# Patient Record
Sex: MALE | Race: WHITE | NOT HISPANIC OR LATINO | Employment: OTHER | URBAN - METROPOLITAN AREA
[De-identification: names, ages, dates, MRNs, and addresses within clinical notes are randomized per-mention and may not be internally consistent; named-entity substitution may affect disease eponyms.]

---

## 2017-05-30 ENCOUNTER — HOSPITAL ENCOUNTER (EMERGENCY)
Facility: CLINIC | Age: 62
Discharge: HOME | End: 2017-05-30
Attending: FAMILY MEDICINE | Admitting: FAMILY MEDICINE
Payer: COMMERCIAL

## 2017-05-30 ENCOUNTER — APPOINTMENT (OUTPATIENT)
Dept: GENERAL RADIOLOGY | Facility: CLINIC | Age: 62
End: 2017-05-30
Attending: FAMILY MEDICINE
Payer: COMMERCIAL

## 2017-05-30 VITALS
SYSTOLIC BLOOD PRESSURE: 136 MMHG | RESPIRATION RATE: 14 BRPM | OXYGEN SATURATION: 99 % | HEART RATE: 80 BPM | DIASTOLIC BLOOD PRESSURE: 82 MMHG | TEMPERATURE: 98.4 F

## 2017-05-30 DIAGNOSIS — S61.241A: ICD-10-CM
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
## 2017-05-30 DIAGNOSIS — S60.459A FOREIGN BODY OF FINGER OF LEFT HAND, INITIAL ENCOUNTER: Primary | ICD-10-CM

## 2017-05-30 PROCEDURE — 99283 EMERGENCY DEPT VISIT LOW MDM: CPT | Performed by: FAMILY MEDICINE

## 2017-05-30 PROCEDURE — 25000132 ZZH RX MED GY IP 250 OP 250 PS 637: Performed by: FAMILY MEDICINE

## 2017-05-30 PROCEDURE — 99283 EMERGENCY DEPT VISIT LOW MDM: CPT | Mod: Z6 | Performed by: FAMILY MEDICINE

## 2017-05-30 PROCEDURE — 25000125 ZZHC RX 250: Performed by: FAMILY MEDICINE

## 2017-05-30 PROCEDURE — 73140 X-RAY EXAM OF FINGER(S): CPT | Mod: TC,LT

## 2017-05-30 RX ORDER — LIDOCAINE HYDROCHLORIDE 10 MG/ML
10 INJECTION, SOLUTION INFILTRATION; PERINEURAL ONCE
Status: COMPLETED | OUTPATIENT
Start: 2017-05-30 | End: 2017-05-30

## 2017-05-30 RX ORDER — LORATADINE 10 MG/1
10 TABLET ORAL DAILY
COMMUNITY

## 2017-05-30 RX ADMIN — AMOXICILLIN AND CLAVULANATE POTASSIUM 1 TABLET: 875; 125 TABLET, FILM COATED ORAL at 14:33

## 2017-05-30 RX ADMIN — LIDOCAINE HYDROCHLORIDE 2 ML: 10 INJECTION, SOLUTION INFILTRATION; PERINEURAL at 14:44

## 2017-05-30 NOTE — ED NOTES
Nail pulled out by MD. Finger started bleeding. Dressing applied. Pt can now go to xray. Pain is under control. Current on TDAP.

## 2017-05-30 NOTE — ED AVS SNAPSHOT
[facility] Emergency Department    [address] [place] [address]    Phone:  [phone]    Fax:  [phone]                                       Arias Mckeon   MRN: [id number]    Department:  [facility] Emergency Department   Date of Visit:  5/30/2017           After Visit Summary Signature Page     I have received my discharge instructions, and my questions have been answered. I have discussed any challenges I see with this plan with the nurse or doctor.    ..........................................................................................................................................  Patient/Patient Representative Signature      ..........................................................................................................................................  Patient Representative Print Name and Relationship to Patient    ..................................................               ................................................  Date                                            Time    ..........................................................................................................................................  Reviewed by Signature/Title    ...................................................              ..............................................  Date                                                            Time

## 2017-05-30 NOTE — ED PROVIDER NOTES
History     Chief Complaint   Patient presents with     Hand Injury     The history is provided by the patient.     Arias Mckeon is a 62 year old male who is here with a 16d nail in the left index finger. He shot it from a nail gun and it went across the back of the left index finger. This happened prior to arrival.  The finger still seems to work okay. His last tetanus was in the past few years.    Nurse Note:  Here with hand injury. Nail to second digit on left hand. Nail through and through the top part of tissue on pointer finger. Last tetanus unknown      I have reviewed the Medications, Allergies, Past Medical and Surgical History, and Social History in the Epic system.    MIIC shows that his last tetanus was October 2013.     Review of Systems   Musculoskeletal:        No previous injury to this finger   All other systems reviewed and are negative.      Physical Exam   BP: 127/77  Heart Rate: 55  Temp: 98.4  F (36.9  C)  Resp: 14  SpO2: 99 %    Physical Exam   Cardiovascular: Intact distal pulses.    Musculoskeletal:   He has a 16 d nail in-and-out of the dorsum of the left index finger, over the middle phalange. See picture. There is full ROM of the left index finger.   Neurological:   Normal sensation of the left index finger   Nursing note and vitals reviewed.      ED Course  2:44 PM  After I used 2 mL of 1% lidocaine at the base of the left index finger, I was able to pull the nail with a linesman's pliers that we have here for this purpose. We will clean this up, get an xray and apply a dressing. His last tetanus was Oct 2013 and we will not update that today.  He would like his Augmentin Rx to [facility] in [place].        ED Course     Procedures    No results found for this or any previous visit (from the past 24 hour(s)).    Medications   lidocaine 1 % injection 10 mL (2 mLs Intradermal Given by Other 5/30/17 5649)   amoxicillin-clavulanate (AUGMENTIN) 875-125 MG per tablet 1 tablet (1 tablet  Oral Given 5/30/17 1323)         Assessments & Plan (with Medical Decision Making)  Arias is a 61 yo male here with a 16d nail in the left index finger.  He was using a nail gun in his right hand when the nail went through the back of his left index finger.  His last tetanus shot was October 2013 and we will not update that today.  I used 2 mL of lidocaine to numb up the finger and pulled the nail out with a linesman's pliers.  X-ray of the finger shows no fracture and no foreign body, and we cleaned up the finger and bandaged it.  I will send a prescription for Augmentin to the [facility] for him.  He was discharged from the ED.       I have reviewed the nursing notes.    I have reviewed the findings, diagnosis, plan and need for follow up with the patient.    New Prescriptions    AMOXICILLIN-CLAVULANATE (AUGMENTIN) 875-125 MG PER TABLET    Take 1 tablet by mouth 2 times daily for 7 days       Final diagnoses:   Foreign body of finger of left hand, initial encounter       5/30/2017   [facility] EMERGENCY DEPARTMENT     Zhao Araujo MD  05/30/17 8435

## 2017-05-30 NOTE — ED NOTES
Here with hand injury. Nail to second digit on left hand. Nail through and through the top part of tissue on pointer finger. Last tetanus unknown

## 2017-05-30 NOTE — DISCHARGE INSTRUCTIONS
16d Nail Under the Skin (Removed)  An object, or foreign body, has been removed from under your skin. Although care was taken to remove all particles present, there is always a chance that a small piece may have been left behind.  Most skin wounds heal without problems. But, there can be an increased risk of infection if any stay under the skin. Sometimes they work their way out on their own, and sometimes they can cause an infection. Very small particles that remain under the skin usually cause no problem and need no further treatment.  Home care  Wound care    Keep the wound clean and dry.    If there is a dressing or bandage, change it when it gets wet or dirty. Otherwise, leave it on for the first 24 hours, then change it once a day or as often as you were instructed.    If stitches or staples were used, clean the wound every day:    After taking off the dressing, wash the area gently with soap and water.    Apply a thin layer of antibiotic ointment to the cut, not a lot. This will keep the wound clean and make it easier to remove the stitches. If it is oozing a lot, you can put a nonstick dressing over it. Then reapply the bandage or dressing as you were instructed.    You can get it wet, just like when you clean it. This means you can shower as usual for the first 24 hours, but do not soak the area in water (no baths tches or swimming) until the stitches or staples are take out.    If a surgical tape or strips were used, keep the area clean and dry. If it becomes wet, blot it dry with a towel.  disease or  Medication    You can take acetaminophen or ibuprofen for pain, unless you were given a different pain medicine to use. If you have chronic liver or kidney disease or ever had a stomach ulcer, or gastrointestinal bleeding or are taking blood thinner medications, talk with your doctor before using these medications.    I did send a prescription for Augmentin to the [facility] pharmacy in [place]. It is  important to finish the antibiotics even if the wound looks better to make sure the infection clears.     Follow-up care  Follow up your doctor or clinic as advised.    Watch for any signs of infection, such as increasing pain, redness, swelling, or pus drainage. If this happens, do not wait for your scheduled visit, rather see a doctor sooner.    Note: Any X-rays taken will be reviewed by a radiologist. You will be notified if there are new findings that may affect your care.  When to seek medical care  Get prompt medical attention if any of the following occur:    Increasing pain in the wound    Redness, swelling or pus coming from the wound    Fever of 100.4 F (38 C) or higher, or as directed by your health care provider    Thank you for choosing our Emergency Department for your care.     Sincerely,    Dr Everton Araujo M.D.

## 2017-06-28 NOTE — ED AVS SNAPSHOT
[facility] Emergency Department    [address] [place] [address]    Phone:  [phone]    Fax:  [phone]                                       Arias Mckeon   MRN: [id number]    Department:  [facility] Emergency Department   Date of Visit:  5/30/2017           Patient Information     Date Of Birth          1955        Your diagnoses for this visit were:     Foreign body of finger of left hand, initial encounter        You were seen by Zhao Araujo MD.      Follow-up Information     Schedule an appointment as soon as possible for a visit with Janine Saul PA-C.    Specialty:  Physician Assistant    Why:  As needed    Contact information:    [facility]  [address] [address]  [phone]          Schedule an appointment as soon as possible for a visit with Janine Saul PA-C.    Specialty:  Physician Assistant    Why:  As needed    Contact information:    [facility]  [address] [address]  [phone]          Discharge Instructions         16d Nail Under the Skin (Removed)  An object, or foreign body, has been removed from under your skin. Although care was taken to remove all particles present, there is always a chance that a small piece may have been left behind.  Most skin wounds heal without problems. But, there can be an increased risk of infection if any stay under the skin. Sometimes they work their way out on their own, and sometimes they can cause an infection. Very small particles that remain under the skin usually cause no problem and need no further treatment.  Home care  Wound care    Keep the wound clean and dry.    If there is a dressing or bandage, change it when it gets wet or dirty. Otherwise, leave it on for the first 24 hours, then change it once a day or as often as you were instructed.    If stitches or staples were used, clean the wound every day:    After taking off the  S:  feels better, no bleeding,   Feels anxious and would like to restart antihypertensives    : maya,      06/28/17  0553   BP: 155/88   Pulse: 96   Resp:    Temp:      Abd: soft, sl tender    Ext: no c/c/e    CBC:    Lab Results  Component Value Date   WBC dressing, wash the area gently with soap and water.    Apply a thin layer of antibiotic ointment to the cut, not a lot. This will keep the wound clean and make it easier to remove the stitches. If it is oozing a lot, you can put a nonstick dressing over it. Then reapply the bandage or dressing as you were instructed.    You can get it wet, just like when you clean it. This means you can shower as usual for the first 24 hours, but do not soak the area in water (no baths tches or swimming) until the stitches or staples are take out.    If a surgical tape or strips were used, keep the area clean and dry. If it becomes wet, blot it dry with a towel.  disease or  Medication    You can take acetaminophen or ibuprofen for pain, unless you were given a different pain medicine to use. If you have chronic liver or kidney disease or ever had a stomach ulcer, or gastrointestinal bleeding or are taking blood thinner medications, talk with your doctor before using these medications.    I did send a prescription for Augmentin to the [facility] pharmacy in [place]. It is important to finish the antibiotics even if the wound looks better to make sure the infection clears.     Follow-up care  Follow up your doctor or clinic as advised.    Watch for any signs of infection, such as increasing pain, redness, swelling, or pus drainage. If this happens, do not wait for your scheduled visit, rather see a doctor sooner.    Note: Any X-rays taken will be reviewed by a radiologist. You will be notified if there are new findings that may affect your care.  When to seek medical care  Get prompt medical attention if any of the following occur:    Increasing pain in the wound    Redness, swelling or pus coming from the wound    Fever of 100.4 F (38 C) or higher, or as directed by your health care provider    Thank you for choosing our Emergency Department for your care.     Sincerely,    Dr Everton Araujo M.D.          24 Hour Appointment  Hotline       To make an appointment at any [facility], call [phone] ([phone]). If you don't have a family doctor or clinic, we will help you find one. [facility] are conveniently located to serve the needs of you and your family.             Review of your medicines      START taking        Dose / Directions Last dose taken    amoxicillin-clavulanate 875-125 MG per tablet   Commonly known as:  AUGMENTIN   Dose:  1 tablet   Quantity:  14 tablet        Take 1 tablet by mouth 2 times daily for 7 days   Refills:  0          Our records show that you are taking the medicines listed below. If these are incorrect, please call your family doctor or clinic.        Dose / Directions Last dose taken    AMLODIPINE BESYLATE PO        Take by mouth daily   Refills:  0        LIPITOR PO   Dose:  40 mg        Take 40 mg by mouth daily   Refills:  0        lisinopril-hydrochlorothiazide 20-12.5 MG per tablet   Commonly known as:  PRINZIDE/ZESTORETIC   Dose:  1 tablet        Take 1 tablet by mouth daily   Refills:  0        loratadine 10 MG tablet   Commonly known as:  CLARITIN   Dose:  10 mg        Take 10 mg by mouth daily   Refills:  0                Prescriptions were sent or printed at these locations (1 Prescription)                   [facility] [address] [place] [address]    Telephone:  [phone]   Fax:  [phone]   Hours:                  E-Prescribed (1 of 1)         amoxicillin-clavulanate (AUGMENTIN) 875-125 MG per tablet                Procedures and tests performed during your visit     Fingers XR, 2-3 views, left      Orders Needing Specimen Collection     None      Pending Results     Date and Time Order Name Status Description    5/30/2017 1434 Fingers XR, 2-3 views, left In process             Pending Culture Results     No orders found from 5/28/2017 to 5/31/2017.            Pending Results Instructions     If you had any  "lab results that were not finalized at the time of your Discharge, you can call the ED Lab Result RN at [phone]. You will be contacted by this team for any positive Lab results or changes in treatment. The nurses are available 7 days a week from 10A to 6:30P.  You can leave a message 24 hours per day and they will return your call.        Thank you for choosing [facility]       Thank you for choosing [facility] for your care. Our goal is always to provide you with excellent care. Hearing back from our patients is one way we can continue to improve our services. Please take a few minutes to complete the written survey that you may receive in the mail after you visit with us. Thank you!        [facility] Information     [facility] lets you send messages to your doctor, view your test results, renew your prescriptions, schedule appointments and more. To sign up, go to [email or web address] . Click on \"Log in\" on the left side of the screen, which will take you to the Welcome page. Then click on \"Sign up Now\" on the right side of the page.     You will be asked to enter the access code listed below, as well as some personal information. Please follow the directions to create your username and password.     Your access code is: [email or web address]  Expires: 2017  3:30 PM     Your access code will  in 90 days. If you need help or a new code, please call your [facility] clinic or [phone].        Care EveryWhere ID     This is your Care EveryWhere ID. This could be used by other organizations to access your [facility] medical records  [id number]        After Visit Summary       This is your record. Keep this with you and show to your community pharmacist(s) and doctor(s) at your next visit.                  "

## 2023-07-25 ENCOUNTER — HOSPITAL ENCOUNTER (EMERGENCY)
Facility: CLINIC | Age: 68
Discharge: HOME | End: 2023-07-25
Attending: STUDENT IN AN ORGANIZED HEALTH CARE EDUCATION/TRAINING PROGRAM | Admitting: STUDENT IN AN ORGANIZED HEALTH CARE EDUCATION/TRAINING PROGRAM
Payer: COMMERCIAL

## 2023-07-25 VITALS
TEMPERATURE: 98.2 F | HEART RATE: 75 BPM | HEIGHT: 73 IN | WEIGHT: 256.7 LBS | BODY MASS INDEX: 34.02 KG/M2 | RESPIRATION RATE: 18 BRPM | DIASTOLIC BLOOD PRESSURE: 68 MMHG | SYSTOLIC BLOOD PRESSURE: 121 MMHG | OXYGEN SATURATION: 96 %

## 2023-07-25 DIAGNOSIS — I95.1 ORTHOSTATIC HYPOTENSION: ICD-10-CM

## 2023-07-25 DIAGNOSIS — D72.819 LEUKOPENIA, UNSPECIFIED TYPE: ICD-10-CM

## 2023-07-25 DIAGNOSIS — R42 LIGHTHEADEDNESS: ICD-10-CM

## 2023-07-25 DIAGNOSIS — D64.9 ANEMIA, UNSPECIFIED TYPE: ICD-10-CM

## 2023-07-25 LAB
ALBUMIN SERPL BCG-MCNC: 3.5 G/DL (ref 3.5–5.2)
ALP SERPL-CCNC: 76 U/L (ref 40–129)
ALT SERPL W P-5'-P-CCNC: 17 U/L (ref 0–70)
ANION GAP SERPL CALCULATED.3IONS-SCNC: 9 MMOL/L (ref 7–15)
AST SERPL W P-5'-P-CCNC: 9 U/L (ref 0–45)
BILIRUB SERPL-MCNC: 0.7 MG/DL
BUN SERPL-MCNC: 21.3 MG/DL (ref 8–23)
CALCIUM SERPL-MCNC: 8.7 MG/DL (ref 8.8–10.2)
CHLORIDE SERPL-SCNC: 103 MMOL/L (ref 98–107)
CREAT SERPL-MCNC: 1.12 MG/DL (ref 0.67–1.17)
DEPRECATED HCO3 PLAS-SCNC: 29 MMOL/L (ref 22–29)
ERYTHROCYTE [DISTWIDTH] IN BLOOD BY AUTOMATED COUNT: 15.7 % (ref 10–15)
GFR SERPL CREATININE-BSD FRML MDRD: 71 ML/MIN/1.73M2
GLUCOSE SERPL-MCNC: 153 MG/DL (ref 70–99)
HCT VFR BLD AUTO: 30.8 % (ref 40–53)
HGB BLD-MCNC: 10.3 G/DL (ref 13.3–17.7)
MCH RBC QN AUTO: 30.2 PG (ref 26.5–33)
MCHC RBC AUTO-ENTMCNC: 33.4 G/DL (ref 31.5–36.5)
MCV RBC AUTO: 90 FL (ref 78–100)
PLATELET # BLD AUTO: 60 10E3/UL (ref 150–450)
POTASSIUM SERPL-SCNC: 3.7 MMOL/L (ref 3.4–5.3)
PROT SERPL-MCNC: 5.4 G/DL (ref 6.4–8.3)
RBC # BLD AUTO: 3.41 10E6/UL (ref 4.4–5.9)
SODIUM SERPL-SCNC: 141 MMOL/L (ref 136–145)
TROPONIN T SERPL HS-MCNC: 15 NG/L
WBC # BLD AUTO: 1.5 10E3/UL (ref 4–11)

## 2023-07-25 PROCEDURE — 93010 ELECTROCARDIOGRAM REPORT: CPT | Performed by: STUDENT IN AN ORGANIZED HEALTH CARE EDUCATION/TRAINING PROGRAM

## 2023-07-25 PROCEDURE — 84484 ASSAY OF TROPONIN QUANT: CPT | Performed by: STUDENT IN AN ORGANIZED HEALTH CARE EDUCATION/TRAINING PROGRAM

## 2023-07-25 PROCEDURE — 99284 EMERGENCY DEPT VISIT MOD MDM: CPT | Performed by: STUDENT IN AN ORGANIZED HEALTH CARE EDUCATION/TRAINING PROGRAM

## 2023-07-25 PROCEDURE — 99283 EMERGENCY DEPT VISIT LOW MDM: CPT | Mod: 25 | Performed by: STUDENT IN AN ORGANIZED HEALTH CARE EDUCATION/TRAINING PROGRAM

## 2023-07-25 PROCEDURE — 85027 COMPLETE CBC AUTOMATED: CPT | Performed by: STUDENT IN AN ORGANIZED HEALTH CARE EDUCATION/TRAINING PROGRAM

## 2023-07-25 PROCEDURE — 80053 COMPREHEN METABOLIC PANEL: CPT | Performed by: STUDENT IN AN ORGANIZED HEALTH CARE EDUCATION/TRAINING PROGRAM

## 2023-07-25 PROCEDURE — 36415 COLL VENOUS BLD VENIPUNCTURE: CPT | Performed by: STUDENT IN AN ORGANIZED HEALTH CARE EDUCATION/TRAINING PROGRAM

## 2023-07-25 PROCEDURE — 93005 ELECTROCARDIOGRAM TRACING: CPT | Performed by: STUDENT IN AN ORGANIZED HEALTH CARE EDUCATION/TRAINING PROGRAM

## 2023-07-25 ASSESSMENT — ACTIVITIES OF DAILY LIVING (ADL)
ADLS_ACUITY_SCORE: 33
ADLS_ACUITY_SCORE: 35

## 2023-07-25 NOTE — ED NOTES
"Pt reports feeling slightly light headed when changing from laying to sitting and slightly increased with standing. Pt states \"It wasn't like earlier and I never felt like I was going to fall\".   "

## 2023-07-25 NOTE — ED PROVIDER NOTES
History     Chief Complaint   Patient presents with    Dizziness     HPI  Arias Mckeon is a 68 year old male with history of hypertension, COPD, non-Hodgkin's lymphoma currently undergoing chemotherapy presents for evaluation of lightheadedness.  Patient describes feeling lightheaded when standing up from the table after eating lunch today.  This persisted for a few minutes and he felt as though he needed to brace himself on the counter to avoid falling down.  He did not lose consciousness and denies any room spinning sensation.  Patient was subsequently able to make his way to a chair and check his blood pressure.  SBP was first in the 120s and subsequently decreased into the 90s once at rest and with resolution of lightheadedness.  Patient felt well this morning and ate lunch normally.  His most recent chemo session was 8 days ago and he feels baseline nausea.  He denies recent illness, new nausea/vomiting, any chest pain, palpitations, or shortness of breath, headache, vision changes, focal numbness/tingling/weakness, other complaints today.      Of note, he has had adjustments to his BP medication (metoprolol) over the last several months, most recently in June.  No other complaints today.    Allergies:  No Known Allergies    Problem List:    Patient Active Problem List    Diagnosis Date Noted    Acute appendicitis, uncomplicated 11/07/2015     Priority: Medium        Past Medical History:    History reviewed. No pertinent past medical history.    Past Surgical History:    Past Surgical History:   Procedure Laterality Date    LAPAROSCOPIC APPENDECTOMY N/A 11/7/2015    Procedure: LAPAROSCOPIC APPENDECTOMY;  Surgeon: Herrera Rivera MD;  Location: [facility] OR       Family History:    History reviewed. No pertinent family history.    Social History:  Marital Status:   [2]  Social History     Tobacco Use    Smoking status: Former        Medications:    AMLODIPINE BESYLATE PO  Atorvastatin Calcium (LIPITOR  "PO)  lisinopril-hydrochlorothiazide (PRINZIDE,ZESTORETIC) 20-12.5 MG per tablet  loratadine (CLARITIN) 10 MG tablet      Review of Systems   All other systems reviewed and are negative.  See HPI.    Physical Exam   BP: 122/79  Pulse: 75  Temp: 98.2  F (36.8  C)  Resp: 18  Height: 185.4 cm (6' 1\")  Weight: 116.4 kg (256 lb 11.2 oz)  SpO2: 94 %  Lying Orthostatic BP: 109/58  Lying Orthostatic Pulse: 67 bpm  Sitting Orthostatic BP: 110/62  Sitting Orthostatic Pulse: 72 bpm  Standing Orthostatic BP: 113/68  Standing Orthostatic Pulse: 72 bpm      Physical Exam  Constitutional:       General: He is not in acute distress.     Appearance: Normal appearance. He is not toxic-appearing.      Comments: Somewhat pale.   HENT:      Head: Normocephalic and atraumatic.      Mouth/Throat:      Comments: Tacky mucous membranes.  Eyes:      General: No scleral icterus.     Extraocular Movements: Extraocular movements intact.      Conjunctiva/sclera: Conjunctivae normal.      Pupils: Pupils are equal, round, and reactive to light.   Cardiovascular:      Rate and Rhythm: Normal rate and regular rhythm.      Pulses: Normal pulses.      Heart sounds: Normal heart sounds.   Pulmonary:      Effort: Pulmonary effort is normal. No respiratory distress.      Breath sounds: Normal breath sounds. No wheezing.   Abdominal:      General: There is no distension.      Palpations: Abdomen is soft.      Tenderness: There is no abdominal tenderness. There is no guarding or rebound.   Musculoskeletal:         General: No swelling, tenderness or deformity. Normal range of motion.      Cervical back: Normal range of motion and neck supple. No tenderness.   Skin:     General: Skin is warm.      Capillary Refill: Capillary refill takes less than 2 seconds.      Coloration: Skin is pale (mild).      Findings: No erythema or rash.   Neurological:      General: No focal deficit present.      Mental Status: He is alert and oriented to person, place, and time. "      Cranial Nerves: No cranial nerve deficit.      Sensory: No sensory deficit.      Motor: No weakness.      Coordination: Coordination normal.      Gait: Gait normal.   Psychiatric:         Mood and Affect: Mood normal.         Behavior: Behavior normal.          ED Course               Procedures         EKG performed at 1341.  Normal sinus rhythm, rate 68.  Normal axis.  Normal intervals.  Nonspecific T wave changes.  Study interpreted independently by me.         Results for orders placed or performed during the hospital encounter of 07/25/23 (from the past 24 hour(s))   CBC with platelets   Result Value Ref Range    WBC Count 1.5 (L) 4.0 - 11.0 10e3/uL    RBC Count 3.41 (L) 4.40 - 5.90 10e6/uL    Hemoglobin 10.3 (L) 13.3 - 17.7 g/dL    Hematocrit 30.8 (L) 40.0 - 53.0 %    MCV 90 78 - 100 fL    MCH 30.2 26.5 - 33.0 pg    MCHC 33.4 31.5 - 36.5 g/dL    RDW 15.7 (H) 10.0 - 15.0 %    Platelet Count 60 (L) 150 - 450 10e3/uL   Comprehensive metabolic panel   Result Value Ref Range    Sodium 141 136 - 145 mmol/L    Potassium 3.7 3.4 - 5.3 mmol/L    Chloride 103 98 - 107 mmol/L    Carbon Dioxide (CO2) 29 22 - 29 mmol/L    Anion Gap 9 7 - 15 mmol/L    Urea Nitrogen 21.3 8.0 - 23.0 mg/dL    Creatinine 1.12 0.67 - 1.17 mg/dL    Calcium 8.7 (L) 8.8 - 10.2 mg/dL    Glucose 153 (H) 70 - 99 mg/dL    Alkaline Phosphatase 76 40 - 129 U/L    AST 9 0 - 45 U/L    ALT 17 0 - 70 U/L    Protein Total 5.4 (L) 6.4 - 8.3 g/dL    Albumin 3.5 3.5 - 5.2 g/dL    Bilirubin Total 0.7 <=1.2 mg/dL    GFR Estimate 71 >60 mL/min/1.73m2   Troponin T, High Sensitivity   Result Value Ref Range    Troponin T, High Sensitivity 15 <=22 ng/L       Medications - No data to display    Assessments & Plan (with Medical Decision Making)     I have reviewed the nursing notes.    I have reviewed the findings, diagnosis, plan and need for follow up with the patient.    Medical Decision Making  Arais Mckeon is a 68 year old male with history of hypertension,  COPD, non-Hodgkin's lymphoma currently undergoing chemotherapy presents for evaluation of lightheadedness.  Normal vitals on arrival.  Exam very reassuring overall.  He has some mild pallor but otherwise appears nontoxic.  Mucous membranes are slightly dry.  Neurological exam is entirely nonfocal.  No respiratory distress and lung sounds are clear.  Patient is currently undergoing chemotherapy and has had some decreased appetite.  He also reports recent medication changes with increases in metoprolol over the last month.  I suspect he had a vasovagal/orthostatic episode today based on description of symptoms and temporary decrease in BP at home.  Differential also includes electrolyte abnormalities, dysrhythmia, acute infectious processes.  He feels asymptomatic now.  Patient did again experience some lightheadedness during orthostatic testing but his orthostatic vitals were actually negative.  He is tolerating oral intake and therefore did not require IV fluids.  Labs showed leukopenia (1.5), anemia (hemoglobin 10.3), thrombocytopenia (60).  This is consistent with known chemotherapy and I do not suspect acute infectious process or blood loss as he denies any related symptoms.  Electrolytes are also within normal limits so acute electrolyte abnormality is unlikely as well.  Troponin was negative and EKG showed no acute ischemic changes.  He continued to feel asymptomatic during observation period in the emergency department.  Will discharge home with supportive cares, instructions to drink plenty of fluids.  Patient will follow-up with his primary care doctor this week for recheck and possible medication adjustments given his recent changes.  He will also continue cancer care as previously scheduled.  Advised that he return to the emergency department in the meantime for any new or acutely worsened symptoms.    Discharge Medication List as of 7/25/2023  3:04 PM          Final diagnoses:   Orthostatic hypotension    Lightheadedness   Leukopenia, unspecified type   Anemia, unspecified type       7/25/2023   [facility] EMERGENCY DEPT       Gabo Boo MD  07/25/23 1925

## 2023-07-25 NOTE — DISCHARGE INSTRUCTIONS
Your test results today were reassuring overall.  Some of your blood levels are low but this is almost certainly due to ongoing chemotherapy.  Your electrolytes appeared normal and you are showing no signs of heart stress.  I suspect you had an episode of orthostatic hypotension, meaning low blood pressure with position changes.  There may also be some component of effect from your recent blood pressure medication changes.  Be conscious of position changes over the next several days, rise from a laying/seated position slowly.  Rest and drink plenty of fluids.  Follow-up with your primary care doctor for BP checks and possible medication adjustments.  Return to the emergency department in the meantime for any new or acutely worsened symptoms.

## 2023-07-25 NOTE — ED TRIAGE NOTES
He got up from the table today and was very dizzy.  He did not fall.  He is on chemo and had his third dose last week.  He is being treated for non Hodgkin's lymphoma